# Patient Record
Sex: MALE | Employment: OTHER | URBAN - NONMETROPOLITAN AREA
[De-identification: names, ages, dates, MRNs, and addresses within clinical notes are randomized per-mention and may not be internally consistent; named-entity substitution may affect disease eponyms.]

---

## 2020-09-21 ENCOUNTER — TELEPHONE (OUTPATIENT)
Dept: CARDIOLOGY CLINIC | Age: 65
End: 2020-09-21

## 2020-09-21 NOTE — TELEPHONE ENCOUNTER
Patient was supposed to have an appointment with Dr. Alex Reynaga on Friday but patient cancelled. LM for patient to call back-does he want to reschedule?

## 2020-09-24 ENCOUNTER — OFFICE VISIT (OUTPATIENT)
Dept: CARDIOLOGY CLINIC | Age: 65
End: 2020-09-24
Payer: MEDICARE

## 2020-09-24 ENCOUNTER — TELEPHONE (OUTPATIENT)
Dept: CARDIOLOGY CLINIC | Age: 65
End: 2020-09-24

## 2020-09-24 VITALS
WEIGHT: 224 LBS | HEART RATE: 77 BPM | BODY MASS INDEX: 32.07 KG/M2 | HEIGHT: 70 IN | SYSTOLIC BLOOD PRESSURE: 148 MMHG | DIASTOLIC BLOOD PRESSURE: 88 MMHG

## 2020-09-24 PROCEDURE — 4004F PT TOBACCO SCREEN RCVD TLK: CPT | Performed by: INTERNAL MEDICINE

## 2020-09-24 PROCEDURE — 4040F PNEUMOC VAC/ADMIN/RCVD: CPT | Performed by: INTERNAL MEDICINE

## 2020-09-24 PROCEDURE — G8417 CALC BMI ABV UP PARAM F/U: HCPCS | Performed by: INTERNAL MEDICINE

## 2020-09-24 PROCEDURE — 1123F ACP DISCUSS/DSCN MKR DOCD: CPT | Performed by: INTERNAL MEDICINE

## 2020-09-24 PROCEDURE — 99204 OFFICE O/P NEW MOD 45 MIN: CPT | Performed by: INTERNAL MEDICINE

## 2020-09-24 PROCEDURE — 93000 ELECTROCARDIOGRAM COMPLETE: CPT | Performed by: INTERNAL MEDICINE

## 2020-09-24 PROCEDURE — 3017F COLORECTAL CA SCREEN DOC REV: CPT | Performed by: INTERNAL MEDICINE

## 2020-09-24 PROCEDURE — G8427 DOCREV CUR MEDS BY ELIG CLIN: HCPCS | Performed by: INTERNAL MEDICINE

## 2020-09-24 RX ORDER — ACETAMINOPHEN AND CODEINE PHOSPHATE 300; 30 MG/1; MG/1
TABLET ORAL
Status: ON HOLD | COMMUNITY
Start: 2020-09-16 | End: 2020-12-04 | Stop reason: ALTCHOICE

## 2020-09-24 RX ORDER — LIDOCAINE AND PRILOCAINE 25; 25 MG/G; MG/G
CREAM TOPICAL
Status: ON HOLD | COMMUNITY
Start: 2020-09-16 | End: 2020-12-04 | Stop reason: ALTCHOICE

## 2020-09-24 NOTE — PROGRESS NOTES
Review of Systems   Constitutional: Negative for chills and fever  HENT: Negative for congestion, sinus pressure, sneezing and sore throat. Eyes: Negative for pain, discharge, redness and itching. Respiratory: Negative for apnea, cough  Gastrointestinal: Negative for blood in stool, constipation, diarrhea   Endocrine: Negative for cold intolerance, heat intolerance, polydipsia. Genitourinary: Negative for dysuria, enuresis, flank pain and hematuria. Musculoskeletal: Negative for arthralgias, joint swelling and neck pain. Neurological: Negative for numbness and headaches. Psychiatric/Behavioral: Negative for agitation, confusion, decreased concentration and dysphoric mood. Objective:     BP (!) 152/93   Pulse 77   Ht 5' 10\" (1.778 m)   Wt 224 lb (101.6 kg)   BMI 32.14 kg/m²     Wt Readings from Last 3 Encounters:   09/24/20 224 lb (101.6 kg)     BP Readings from Last 3 Encounters:   09/24/20 (!) 152/93       Nursing note and vitals reviewed. Physical Exam   Constitutional: Oriented to person, place, and time. Appears well-developed and well-nourished. HENT:   Head: Normocephalic and atraumatic. Eyes: EOM are normal. Pupils are equal, round, and reactive to light. Neck: Normal range of motion. Neck supple. No JVD present. Cardiovascular: Normal rate, regular rhythm, normal heart sounds and intact distal pulses. No murmur heard. Pulmonary/Chest: Effort normal and breath sounds normal. No respiratory distress. No wheezes. No rales. Abdominal: Soft. Bowel sounds are normal. No distension. There is no tenderness. Musculoskeletal: Normal range of motion. No edema. Neurological: Alert and oriented to person, place, and time. No cranial nerve deficit. Coordination normal.   Skin: Skin is warm and dry. L medial ulcer, irregular, bluish discoloration. Psychiatric: Normal mood and affect.        No results found for: CKTOTAL, CKMB, CKMBINDEX    No results found for: WBC, RBC,

## 2020-09-24 NOTE — TELEPHONE ENCOUNTER
Patient called in and stated that he would like to go ahead with the IVUS procedure. Can we get an order?

## 2020-09-29 NOTE — TELEPHONE ENCOUNTER
Peripheral angio scheduled 10-16-20 @ 7:00am  Call to pt, pt requests to wait until November due to his job  Spoke to Abbie Maldonado and dr Jayesh Best  Rescheduled to 12-04-20 @ 1:00pm  Pt informed, date, time and instructions reviewed and mailed to pt

## 2020-12-04 ENCOUNTER — HOSPITAL ENCOUNTER (OUTPATIENT)
Dept: INTERVENTIONAL RADIOLOGY/VASCULAR | Age: 65
Discharge: HOME | End: 2020-12-05
Attending: INTERNAL MEDICINE | Admitting: INTERNAL MEDICINE
Payer: MEDICARE

## 2020-12-04 PROBLEM — I87.9 VENOUS DISEASE: Status: ACTIVE | Noted: 2020-12-04

## 2020-12-04 LAB
ABO: NORMAL
ANION GAP SERPL CALCULATED.3IONS-SCNC: 13 MEQ/L (ref 8–16)
ANTIBODY SCREEN: NORMAL
BUN BLDV-MCNC: 15 MG/DL (ref 7–22)
CALCIUM SERPL-MCNC: 9.4 MG/DL (ref 8.5–10.5)
CHLORIDE BLD-SCNC: 105 MEQ/L (ref 98–111)
CO2: 23 MEQ/L (ref 23–33)
CREAT SERPL-MCNC: 0.9 MG/DL (ref 0.4–1.2)
ERYTHROCYTE [DISTWIDTH] IN BLOOD BY AUTOMATED COUNT: 12.9 % (ref 11.5–14.5)
ERYTHROCYTE [DISTWIDTH] IN BLOOD BY AUTOMATED COUNT: 44.2 FL (ref 35–45)
GFR SERPL CREATININE-BSD FRML MDRD: 84 ML/MIN/1.73M2
GLUCOSE BLD-MCNC: 103 MG/DL (ref 70–108)
HCT VFR BLD CALC: 43.6 % (ref 42–52)
HEMOGLOBIN: 15.1 GM/DL (ref 14–18)
INR BLD: 0.96 (ref 0.85–1.13)
MCH RBC QN AUTO: 32.5 PG (ref 26–33)
MCHC RBC AUTO-ENTMCNC: 34.6 GM/DL (ref 32.2–35.5)
MCV RBC AUTO: 93.8 FL (ref 80–94)
PLATELET # BLD: 217 THOU/MM3 (ref 130–400)
PMV BLD AUTO: 9.9 FL (ref 9.4–12.4)
POTASSIUM SERPL-SCNC: 4.5 MEQ/L (ref 3.5–5.2)
RBC # BLD: 4.65 MILL/MM3 (ref 4.7–6.1)
RH FACTOR: NORMAL
SODIUM BLD-SCNC: 141 MEQ/L (ref 135–145)
WBC # BLD: 7.6 THOU/MM3 (ref 4.8–10.8)

## 2020-12-04 PROCEDURE — 37252 INTRVASC US NONCORONARY 1ST: CPT | Performed by: INTERNAL MEDICINE

## 2020-12-04 PROCEDURE — 6370000000 HC RX 637 (ALT 250 FOR IP): Performed by: INTERNAL MEDICINE

## 2020-12-04 PROCEDURE — 85027 COMPLETE CBC AUTOMATED: CPT

## 2020-12-04 PROCEDURE — 2580000003 HC RX 258: Performed by: INTERNAL MEDICINE

## 2020-12-04 PROCEDURE — 86901 BLOOD TYPING SEROLOGIC RH(D): CPT

## 2020-12-04 PROCEDURE — 37238 OPEN/PERQ PLACE STENT SAME: CPT | Performed by: INTERNAL MEDICINE

## 2020-12-04 PROCEDURE — 76499 UNLISTED DX RADIOGRAPHIC PX: CPT | Performed by: INTERNAL MEDICINE

## 2020-12-04 PROCEDURE — C1725 CATH, TRANSLUMIN NON-LASER: HCPCS

## 2020-12-04 PROCEDURE — 37239 OPEN/PERQ PLACE STENT EA ADD: CPT | Performed by: INTERNAL MEDICINE

## 2020-12-04 PROCEDURE — 80048 BASIC METABOLIC PNL TOTAL CA: CPT

## 2020-12-04 PROCEDURE — 85610 PROTHROMBIN TIME: CPT

## 2020-12-04 PROCEDURE — 75822 VEIN X-RAY ARMS/LEGS: CPT | Performed by: INTERNAL MEDICINE

## 2020-12-04 PROCEDURE — 6360000002 HC RX W HCPCS: Performed by: INTERNAL MEDICINE

## 2020-12-04 PROCEDURE — 37253 INTRVASC US NONCORONARY ADDL: CPT | Performed by: INTERNAL MEDICINE

## 2020-12-04 PROCEDURE — 36010 PLACE CATHETER IN VEIN: CPT | Performed by: INTERNAL MEDICINE

## 2020-12-04 PROCEDURE — 6360000004 HC RX CONTRAST MEDICATION: Performed by: INTERNAL MEDICINE

## 2020-12-04 PROCEDURE — 6360000002 HC RX W HCPCS

## 2020-12-04 PROCEDURE — C1769 GUIDE WIRE: HCPCS

## 2020-12-04 PROCEDURE — 2500000003 HC RX 250 WO HCPCS

## 2020-12-04 PROCEDURE — 86900 BLOOD TYPING SEROLOGIC ABO: CPT

## 2020-12-04 PROCEDURE — C1894 INTRO/SHEATH, NON-LASER: HCPCS

## 2020-12-04 PROCEDURE — 36415 COLL VENOUS BLD VENIPUNCTURE: CPT

## 2020-12-04 PROCEDURE — 2709999900 HC NON-CHARGEABLE SUPPLY

## 2020-12-04 PROCEDURE — 86850 RBC ANTIBODY SCREEN: CPT

## 2020-12-04 RX ORDER — CLOPIDOGREL 300 MG/1
600 TABLET, FILM COATED ORAL ONCE
Status: COMPLETED | OUTPATIENT
Start: 2020-12-04 | End: 2020-12-04

## 2020-12-04 RX ORDER — FENTANYL CITRATE 50 UG/ML
50 INJECTION, SOLUTION INTRAMUSCULAR; INTRAVENOUS ONCE
Status: COMPLETED | OUTPATIENT
Start: 2020-12-04 | End: 2020-12-04

## 2020-12-04 RX ORDER — MIDAZOLAM HYDROCHLORIDE 2 MG/2ML
1 INJECTION, SOLUTION INTRAMUSCULAR; INTRAVENOUS ONCE
Status: COMPLETED | OUTPATIENT
Start: 2020-12-04 | End: 2020-12-04

## 2020-12-04 RX ORDER — ASPIRIN 81 MG/1
81 TABLET ORAL DAILY
COMMUNITY

## 2020-12-04 RX ORDER — SODIUM CHLORIDE 9 MG/ML
INJECTION, SOLUTION INTRAVENOUS CONTINUOUS
Status: DISCONTINUED | OUTPATIENT
Start: 2020-12-04 | End: 2020-12-05 | Stop reason: HOSPADM

## 2020-12-04 RX ORDER — SODIUM CHLORIDE 0.9 % (FLUSH) 0.9 %
10 SYRINGE (ML) INJECTION EVERY 12 HOURS SCHEDULED
Status: DISCONTINUED | OUTPATIENT
Start: 2020-12-04 | End: 2020-12-05 | Stop reason: HOSPADM

## 2020-12-04 RX ORDER — CLOPIDOGREL BISULFATE 75 MG/1
75 TABLET ORAL DAILY
Status: DISCONTINUED | OUTPATIENT
Start: 2020-12-05 | End: 2020-12-05 | Stop reason: HOSPADM

## 2020-12-04 RX ORDER — ACETAMINOPHEN 325 MG/1
650 TABLET ORAL EVERY 4 HOURS PRN
Status: DISCONTINUED | OUTPATIENT
Start: 2020-12-04 | End: 2020-12-05 | Stop reason: HOSPADM

## 2020-12-04 RX ORDER — HEPARIN SODIUM 1000 [USP'U]/ML
12000 INJECTION, SOLUTION INTRAVENOUS; SUBCUTANEOUS ONCE
Status: COMPLETED | OUTPATIENT
Start: 2020-12-04 | End: 2020-12-04

## 2020-12-04 RX ORDER — SODIUM CHLORIDE 0.9 % (FLUSH) 0.9 %
10 SYRINGE (ML) INJECTION PRN
Status: DISCONTINUED | OUTPATIENT
Start: 2020-12-04 | End: 2020-12-05 | Stop reason: HOSPADM

## 2020-12-04 RX ORDER — ASPIRIN 325 MG
325 TABLET ORAL ONCE
Status: COMPLETED | OUTPATIENT
Start: 2020-12-04 | End: 2020-12-04

## 2020-12-04 RX ORDER — SODIUM CHLORIDE 0.9 % (FLUSH) 0.9 %
10 SYRINGE (ML) INJECTION PRN
Status: DISCONTINUED | OUTPATIENT
Start: 2020-12-04 | End: 2020-12-04 | Stop reason: SDUPTHER

## 2020-12-04 RX ORDER — ASPIRIN 81 MG/1
81 TABLET, CHEWABLE ORAL DAILY
Status: DISCONTINUED | OUTPATIENT
Start: 2020-12-05 | End: 2020-12-05 | Stop reason: HOSPADM

## 2020-12-04 RX ADMIN — MIDAZOLAM 1 MG: 1 INJECTION INTRAMUSCULAR; INTRAVENOUS at 14:17

## 2020-12-04 RX ADMIN — FENTANYL CITRATE 50 MCG: 50 INJECTION, SOLUTION INTRAMUSCULAR; INTRAVENOUS at 14:23

## 2020-12-04 RX ADMIN — SODIUM CHLORIDE: 9 INJECTION, SOLUTION INTRAVENOUS at 11:59

## 2020-12-04 RX ADMIN — ENOXAPARIN SODIUM 100 MG: 100 INJECTION SUBCUTANEOUS at 16:33

## 2020-12-04 RX ADMIN — ASPIRIN 325 MG: 325 TABLET, FILM COATED ORAL at 12:08

## 2020-12-04 RX ADMIN — FENTANYL CITRATE 50 MCG: 50 INJECTION, SOLUTION INTRAMUSCULAR; INTRAVENOUS at 15:01

## 2020-12-04 RX ADMIN — FENTANYL CITRATE 50 MCG: 50 INJECTION, SOLUTION INTRAMUSCULAR; INTRAVENOUS at 14:07

## 2020-12-04 RX ADMIN — MIDAZOLAM 1 MG: 1 INJECTION INTRAMUSCULAR; INTRAVENOUS at 14:23

## 2020-12-04 RX ADMIN — ACETAMINOPHEN 650 MG: 325 TABLET ORAL at 21:35

## 2020-12-04 RX ADMIN — MIDAZOLAM 1 MG: 1 INJECTION INTRAMUSCULAR; INTRAVENOUS at 15:01

## 2020-12-04 RX ADMIN — IOPAMIDOL 100 ML: 612 INJECTION, SOLUTION INTRAVENOUS at 15:25

## 2020-12-04 RX ADMIN — HEPARIN SODIUM 12000 UNITS: 1000 INJECTION INTRAVENOUS; SUBCUTANEOUS at 14:50

## 2020-12-04 RX ADMIN — CLOPIDOGREL BISULFATE 600 MG: 300 TABLET, FILM COATED ORAL at 15:38

## 2020-12-04 RX ADMIN — MIDAZOLAM 1 MG: 1 INJECTION INTRAMUSCULAR; INTRAVENOUS at 14:07

## 2020-12-04 RX ADMIN — FENTANYL CITRATE 50 MCG: 50 INJECTION, SOLUTION INTRAMUSCULAR; INTRAVENOUS at 14:17

## 2020-12-04 ASSESSMENT — PAIN SCALES - GENERAL
PAINLEVEL_OUTOF10: 0
PAINLEVEL_OUTOF10: 2
PAINLEVEL_OUTOF10: 2
PAINLEVEL_OUTOF10: 0
PAINLEVEL_OUTOF10: 2
PAINLEVEL_OUTOF10: 5
PAINLEVEL_OUTOF10: 2
PAINLEVEL_OUTOF10: 0
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
## 2020-12-04 NOTE — PROGRESS NOTES
80 Pt arrived in IR for procedure with Dr. Olena Cotter. Denies c/o pain at this time.    1353 Report given to [facility], [facility]

## 2020-12-04 NOTE — PROGRESS NOTES
Care taken over from IR. VSS. Slipknot closure devices in place to bilateral popliteal veins. No bleeding or swelling noted. Patient reinstructed on post-procedure instructions, pt expressed understanding. 0.9 NS infusing. Pt tolerating sips of water without difficulty. Will continue to monitor.

## 2020-12-04 NOTE — BRIEF OP NOTE
[address] [address]  Sedation/Analgesia Post Sedation Record    Pt Name: Eliza Bettencourt  Account number: [de-identified]  MRN: [id number]  YOB: 1955  Procedure Performed By: Alexus Delatorre, [address]  Primary Care Physician: Cas Palmer MD  Date: 12/4/2020    POST-PROCEDURE    Physicians/Assistants: SYLVESTER Briseno MD    Procedure Performed:Peripheral Angiogram/Intervention      Sedation/Anesthesia: Versed/ Fentanyl and 2% xylocaine local anesthesia. Estimated Blood Loss: < 50 ml. Specimens Removed: None         Disposition of Specimen: N/A        Complications: No Immediate Complications.        Post-procedure Diagnosis/Findings:    Bilateral EIV compression by overlying arterial system, however patient is adamant that there are no symptoms or physical issues on the RLE, thus we proceeded with left sided intervention    IVUS guided Venovo stenting with 18 x 80 and 16 x 80 post dilated with an 18 mm [place] with brisk flow into the IVC    Lovenox 1 mg/kg BID and DAPT                 SYLVESTER Briseno MD  Electronically signed 12/4/2020 at 3:34 PM  Interventional Cardiology

## 2020-12-05 VITALS
HEIGHT: 70 IN | DIASTOLIC BLOOD PRESSURE: 77 MMHG | WEIGHT: 220 LBS | SYSTOLIC BLOOD PRESSURE: 116 MMHG | TEMPERATURE: 97.8 F | BODY MASS INDEX: 31.5 KG/M2 | HEART RATE: 64 BPM | RESPIRATION RATE: 15 BRPM | OXYGEN SATURATION: 98 %

## 2020-12-05 PROCEDURE — 6360000002 HC RX W HCPCS: Performed by: INTERNAL MEDICINE

## 2020-12-05 PROCEDURE — APPNB30 APP NON BILLABLE TIME 0-30 MINS: Performed by: PHYSICIAN ASSISTANT

## 2020-12-05 RX ORDER — CLOPIDOGREL BISULFATE 75 MG/1
75 TABLET ORAL DAILY
Qty: 30 TABLET | Refills: 3 | Status: SHIPPED | OUTPATIENT
Start: 2020-12-05 | End: 2021-04-26 | Stop reason: SDUPTHER

## 2020-12-05 RX ADMIN — ENOXAPARIN SODIUM 100 MG: 100 INJECTION SUBCUTANEOUS at 04:44

## 2020-12-05 NOTE — PROGRESS NOTES
1020 slip dedra loosened as ordered, patient ambulated in hallway. Band-Aid applied to site no bleeding noted at this time. Patient denies pain, bilateral lower extremeties pulses palpable 2+.

## 2020-12-05 NOTE — PROGRESS NOTES
Cardiology Progress Note      Patient:  Chipper Bumpers  YOB: 1955  MRN: [id number]   Acct: [de-identified]  Admit Date:  12/4/2020  Primary Cardiologist: Festus Esqueda MD    Here for outpt venogram    Subjective (Events in last 24 hours): pt awake and alert. NAD. No cp or sob. Wants to go home. No complaints      Objective:   /77   Pulse 64   Temp 97.8 °F (36.6 °C) (Oral)   Resp 15   Ht 5' 10\" (1.778 m)   Wt 220 lb (99.8 kg)   SpO2 98%   BMI 31.57 kg/m²        TELEMETRY: nsr    Physical Exam:  General Appearance: alert and oriented to person, place and time, in no acute distress  Cardiovascular: normal rate, regular rhythm, normal S1 and S2, no murmurs, rubs, clicks, or gallops, distal pulses intact, no carotid bruits, no JVD  Pulmonary/Chest: clear to auscultation bilaterally- no wheezes, rales or rhonchi, normal air movement, no respiratory distress  Abdomen: soft, non-tender, non-distended, normal bowel sounds, no masses Extremities: no cyanosis, clubbing or edema, pulse   Skin: warm and dry, no rash or erythema  Head: normocephalic and atraumatic  Eyes: pupils equal, round, and reactive to light  Neck: supple and non-tender without mass, no thyromegaly   Musculoskeletal: normal range of motion, no joint swelling, deformity or tenderness  Neurological: alert, oriented, normal speech, no focal findings or movement disorder noted  Access site - no hematoma or bleeding    Medications:    enoxaparin  1 mg/kg Subcutaneous BID    sodium chloride flush  10 mL Intravenous 2 times per day    aspirin  81 mg Oral Daily    clopidogrel  75 mg Oral Daily      sodium chloride 75 mL/hr at 12/04/20 1159     sodium chloride flush, 10 mL, PRN  acetaminophen, 650 mg, Q4H PRN        Lab Data:    Cardiac Enzymes:  No results for input(s): CKTOTAL, CKMB, CKMBINDEX, TROPONINI in the last 72 hours.     CBC:   Lab Results   Component Value Date    WBC 7.6 12/04/2020    RBC 4.65 12/04/2020    HGB 15.1 12/04/2020    HCT 43.6 12/04/2020     12/04/2020       CMP:    Lab Results   Component Value Date     12/04/2020    K 4.5 12/04/2020     12/04/2020    CO2 23 12/04/2020    BUN 15 12/04/2020    CREATININE 0.9 12/04/2020    LABGLOM 84 12/04/2020    GLUCOSE 103 12/04/2020    CALCIUM 9.4 12/04/2020       Hepatic Function Panel:  No results found for: ALKPHOS, ALT, AST, PROT, BILITOT, BILIDIR, IBILI, LABALBU    Magnesium:  No results found for: MG    PT/INR:    Lab Results   Component Value Date    INR 0.96 12/04/2020       HgBA1c:  No results found for: LABA1C    FLP:  No results found for: TRIG, HDL, LDLCALC, LDLDIRECT, LABVLDL    TSH:  No results found for: TSH      Assessment:    Bilateral EIV compression by overlying arterial system, however patient is adamant that there are no symptoms or physical issues on the RLE, thus we proceeded with left sided intervention     IVUS guided Venovo stenting with 18 x 80 and 16 x 80 post dilated with an 18 mm [place] with brisk flow into the IVC     Lovenox 1 mg/kg BID and DAPT       Plan:    Discharge home   See dc instructions         Electronically signed by Everton Morales PA-C on 12/5/2020 at 7:46 AM

## 2020-12-05 NOTE — DISCHARGE INSTR - COC
Continuity of Care Form    Patient Name: Raymundo Alonso   :  1955  MRN:  [id number]    Admit date:  2020  Discharge date:  ***    Code Status Order: Full Code   Advance Directives:   [facility] Documentation     Date/Time Healthcare Directive Type of Healthcare Directive Copy in [facility] [address] Agent's Name Healthcare Agent's Phone Number    20 1137  No, patient does not have an advance directive for healthcare treatment -- -- -- -- --          Admitting Physician:  Vania Diaz MD  PCP: Renée Hays MD    Discharging Nurse: [facility] Unit/Room#: 2E-11/011-A  Discharging Unit Phone Number: ***    Emergency Contact:   Extended Emergency Contact Information  Primary Emergency Contact: [address] [facility] Phone: [phone]  Relation: Spouse   needed? No    Past Surgical History:  Past Surgical History:   Procedure Laterality Date    COLONOSCOPY      KNEE SURGERY      both knees, meniscus tears    ROTATOR CUFF REPAIR Bilateral     VARICOSE VEIN SURGERY      both legs       Immunization History: There is no immunization history on file for this patient.     Active Problems:  Patient Active Problem List   Diagnosis Code    Venous disease I87.9    Venous insufficiency of left lower extremity I87.2    Leg pain, left M79.605    Venous intermittent claudication I87.8       Isolation/Infection:   Isolation          No Isolation        Patient Infection Status     None to display          Nurse Assessment:  Last Vital Signs: /77   Pulse 64   Temp 97.8 °F (36.6 °C) (Oral)   Resp 15   Ht 5' 10\" (1.778 m)   Wt 220 lb (99.8 kg)   SpO2 98%   BMI 31.57 kg/m²     Last documented pain score (0-10 scale): Pain Level: 0  Last Weight:   Wt Readings from Last 1 Encounters:   20 220 lb (99.8 kg)     Mental Status:  {IP PT MENTAL STATUS:}    IV Access:  [facility] IV ACCESS:51953}    Nursing Mobility/ADLs:  Walking   {CHP DME SFKR:969953609}  Transfer  {CHP DME EGYN:098954672}  Bathing  {CHP DME UUUA:272719473}  Dressing  {CHP DME PUST:022764537}  Toileting  {CHP DME ZOQL:987234967}  Feeding  {CHP DME HBDW:474665027}  Med Admin  {P DME JONATHON:258742095}  Med Delivery   {[facility] MED Delivery:565213622}    Wound Care Documentation and Therapy:        Elimination:  Continence:   · Bowel: {YES / XB:82748}  · Bladder: {YES / K}  Urinary Catheter: {Urinary Catheter:484369161}   Colostomy/Ileostomy/Ileal Conduit: {YES / T}       Date of Last BM: ***  No intake or output data in the 24 hours ending 20 0807  No intake/output data recorded.     Safety Concerns:     [employer] Safety Concerns:078031462}    Impairments/Disabilities:      [employer] Impairments/Disabilities:630815636}    Nutrition Therapy:  Current Nutrition Therapy:   [employer] Diet List:641991117}    Routes of Feeding: {[facility] DME Other Feedings:168060510}  Liquids: {Slp liquid thickness:55963}  Daily Fluid Restriction: {P DME Yes amt example:709953634}  Last Modified Barium Swallow with Video (Video Swallowing Test): {Done Not Done XXFP:551690062}    Treatments at the Time of Hospital Discharge:   Respiratory Treatments: ***  Oxygen Therapy:  {Therapy; copd oxygen:88891}  Ventilator:    {[facility] Vent AURM:316228371}    Rehab Therapies: {THERAPEUTIC INTERVENTION:7777804016}  Weight Bearing Status/Restrictions: [address] [employer] Weight Bearin}  Other Medical Equipment (for information only, NOT a DME order):  {EQUIPMENT:112847318}  Other Treatments: ***    Patient's personal belongings (please select all that are sent with patient):  {[facility] DME Belongings:315399120}    RN SIGNATURE:  {Esignature:795179628}    CASE MANAGEMENT/SOCIAL WORK SECTION    Inpatient Status Date: ***    Readmission Risk Assessment Score:  Readmission Risk              Risk of Unplanned Readmission:        0           Discharging to Facility/ Agency   · Name: · Address:  · Phone:  · Fax:    Dialysis Facility (if applicable)   · Name:  · Address:  · Dialysis Schedule:  · Phone:  · Fax:    / signature: {Esignature:991080143}    PHYSICIAN SECTION    Prognosis: {Prognosis:5569681413}    Condition at Discharge: Taniya Hernandez Patient Condition:705369833}    Rehab Potential (if transferring to Rehab): {Prognosis:6428099748}    Recommended Labs or Other Treatments After Discharge: ***    Physician Certification: I certify the above information and transfer of Rober Goldberg  is necessary for the continuing treatment of the diagnosis listed and that he requires {Admit to Appropriate Level of Care:07507} for {GREATER/LESS:964122987} 30 days.      Update Admission H&P: {CHP DME Changes in CVON}    PHYSICIAN SIGNATURE:  {Esignature:895097660}

## 2020-12-08 NOTE — PROCEDURES
[address] [place] [address]                            CARDIAC CATHETERIZATION    PATIENT NAME: Darline Goodson                   :        1955  MED REC NO:   [id number]                           ROOM:       0011  ACCOUNT NO:   [de-identified]                           ADMIT DATE: 2020  PROVIDER:     Troy Calle MD    DATE OF PROCEDURE:  2020    PERIPHERAL VENOGRAPHY/INTERVENTION    INDICATIONS:  Left lower extremity medial ankle ulcer for over 19 years  with superficial varicosities, status post sclerotherapy and CEAP  classification at C6 with bilateral venous stripping. DESCRIPTION OF PROCEDURE:  After informed consent was obtained from the  patient, he was brought to the special procedure suite and prepped in  sterile fashion. Bilateral popliteal veins were chosen as primary  points of access. Preprocedure timeout was completed. The patient was  laid prone and after infiltration of the right and left popliteal fossa  with 2% lidocaine using micropuncture, modified Seldinger technique and  fluoroscopic guidance and ultrasound guidance, I was able to insert an  8-[place] sheath bilaterally into both popliteal veins. I then performed  ascending venography from this position. LEFT LOWER EXTREMITY:  POPLITEAL VEIN:  Patent. FEMORAL VEIN:  Patent. COMMON FEMORAL VEIN:  Patent. EXTERNAL ILIAC VEIN:  Definite narrowing, appears to have compression at  the level of the common iliac vein with streaming of blood flow. COMMON ILIAC VEIN:  Patent. RIGHT LOWER EXTREMITY:  POPLITEAL VEIN:  Patent. FEMORAL VEIN:  Patent. COMMON FEMORAL VEIN:  Patent. EXTERNAL ILIAC VEIN:  Severely narrowed, approximately 80-90%, vein  angiography. COMMON ILIAC VEIN:  80-90% narrowing. IVC:  Patent.     IVUS:  At that point, bilateral 0.035 Glidewires were placed at the IVC  and then IVUS pullback was done from IVC into both common femoral veins. IVUS:  LEFT LOWER EXTREMITY:  The left leg showed a reference area of 71 mm2  with 60-70% iliac stenosis. The external iliac vein also shows  reference area of 72 mm2. Significant perivenous fibrosis and compression by  Overlying arterial structures. CFV  123 mm2. No thrombosis or major webs seen. RIGHT LOWER EXTREMITY:  On the right side, right common iliac vein  showed an area of 78 mm2, the external iliac vein on the right side  showed an area of 22 mm2, and the common femoral vein showed an area of  110 mm2. There was significant perivenous fibrosis and narrowing of  both common iliac and external iliac veins. On the right side, it was  near totally obliterated, held open by the catheter being in position. We did have the patient do inhalation and exhalation exercises to see if  the diameter change. There was almost minimal change in both external  iliac veins. INTERVENTION:  The patient was clear that he did not have nay right lower  extremity symptoms by any means. He felt well. He had no ulcers. He had no  Venous claudication. He had no recurrent ulcerations. He did have  issues with the left lower extremity which he presented for this  reason, we elected to proceed with an intervention on the left side  while monitoring the right side for continued venous disease and  evolution. I exchanged out for a 10-[place] in the left popliteal  fossae. Heparin IV was given. ACT was confirmed to be above 250  seconds. I put the 0.035 Glidewire Advantage up into the IVC. A repeat  IVUS was done to understand the size of the lesion. The lesion involved  the common iliac vein and external iliac vein; however, the diameter  differences were great. Therefore, we needed two stents to cover the  entire lesion and intake of the vessel appropriately. I first  predilated the lesion using 16 x 40 [place] balloon and the entire common  iliac and external iliac vein.   I then stented the lesion using 18 x 80  Venovo venous stent at the common iliac vein level and then overlapping  the stent with a 16 x 80 Venovo venous stent lying almost in normal  section. I then postdilated the stent with 18 [place] balloon. Repeat  IVUS was done at that point, demonstrating good extension of the stent. There was no residual compression shown by any artery or mass. There  was good flow of the IVC. The right lower extremity common iliac vein  and external iliac veins were patent and IVUS confirmed that we had not  jailed either. We had landed the stent just proximal to the confluence,  where the majority of the compression was. Given these findings, no  further intervention was undertaken. IMMEDIATE COMPLICATIONS:  None. MEDICATIONS:  See EMR. ACCESS:  Bilateral figure-of-eight slip knots were used for hemostasis. ESTIMATED BLOOD LOSS:  Less than 50 mL. 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 PLAN:  1. Bedrest.  2.  Optimal medical therapy. 3.  Risk factor management. 4.  Routine access site care. 5.  Overnight observation. 6.  IV fluids. 7.  Lovenox plus DAPT. 8.  Compression stockings. 9.  Follow-up venous ultrasound in one month. [address] to continue to follow up. 11.  Follow up with myself in one month postprocedure. 12.  Monitor RLE for any progressive venous disease. All the above was explained to the patient and the patient's family. They were agreeable and amenable to the plan.         Carmencita Jerome MD    D: 12/08/2020 13:00:53       T: 12/08/2020 14:22:05     [email or web address]  Job#: [id number]     Doc#: [id number]    CC:

## 2020-12-09 ENCOUNTER — TELEPHONE (OUTPATIENT)
Dept: CARDIOLOGY CLINIC | Age: 65
End: 2020-12-09

## 2020-12-09 NOTE — TELEPHONE ENCOUNTER
DIANA  Pt called today concerned with lovenox injections   States 2 out of the 9 shots he has given himself seeped a LITTLE bit of blood for 24 hours, nothing that a Band-Aid couldn't hold   Advised pt to hold a little pressure 1-2 mins after he gives himself an injection to see if that takes are of the issues   No other bleeding issues     Pt advised to call office back if anymore issues     Anything else?

## 2020-12-10 NOTE — TELEPHONE ENCOUNTER
Pt notified and he is doing all the below, he states he is doing ok so far. He does have a little leakage after but a bandaid helps. And feels a little sluggish about an hour after taking.

## 2021-01-15 ENCOUNTER — OFFICE VISIT (OUTPATIENT)
Dept: CARDIOLOGY CLINIC | Age: 66
End: 2021-01-15
Payer: MEDICARE

## 2021-01-15 VITALS
SYSTOLIC BLOOD PRESSURE: 138 MMHG | HEART RATE: 88 BPM | HEIGHT: 70 IN | DIASTOLIC BLOOD PRESSURE: 70 MMHG | BODY MASS INDEX: 33.56 KG/M2 | TEMPERATURE: 97 F | WEIGHT: 234.44 LBS

## 2021-01-15 DIAGNOSIS — L97.922 ULCER OF LEFT LOWER EXTREMITY WITH FAT LAYER EXPOSED (HCC): Primary | ICD-10-CM

## 2021-01-15 DIAGNOSIS — I87.2 VENOUS INSUFFICIENCY OF LEFT LOWER EXTREMITY: ICD-10-CM

## 2021-01-15 PROCEDURE — G8484 FLU IMMUNIZE NO ADMIN: HCPCS | Performed by: INTERNAL MEDICINE

## 2021-01-15 PROCEDURE — 3017F COLORECTAL CA SCREEN DOC REV: CPT | Performed by: INTERNAL MEDICINE

## 2021-01-15 PROCEDURE — G8417 CALC BMI ABV UP PARAM F/U: HCPCS | Performed by: INTERNAL MEDICINE

## 2021-01-15 PROCEDURE — 1036F TOBACCO NON-USER: CPT | Performed by: INTERNAL MEDICINE

## 2021-01-15 PROCEDURE — 99213 OFFICE O/P EST LOW 20 MIN: CPT | Performed by: INTERNAL MEDICINE

## 2021-01-15 PROCEDURE — 4040F PNEUMOC VAC/ADMIN/RCVD: CPT | Performed by: INTERNAL MEDICINE

## 2021-01-15 PROCEDURE — 1123F ACP DISCUSS/DSCN MKR DOCD: CPT | Performed by: INTERNAL MEDICINE

## 2021-01-15 PROCEDURE — G8428 CUR MEDS NOT DOCUMENT: HCPCS | Performed by: INTERNAL MEDICINE
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
## 2021-04-26 RX ORDER — CLOPIDOGREL BISULFATE 75 MG/1
75 TABLET ORAL DAILY
Qty: 30 TABLET | Refills: 1 | Status: SHIPPED | OUTPATIENT
Start: 2021-04-26 | End: 2021-08-13

## 2021-04-26 NOTE — TELEPHONE ENCOUNTER
Juanita Hamilton called requesting a refill on the following medications:  Requested Prescriptions     Pending Prescriptions Disp Refills    clopidogrel (PLAVIX) 75 MG tablet 30 tablet 3     Sig: Take 1 tablet by mouth daily     Pharmacy verified:[facility]      Date of last visit:   Date of next visit (if applicable): Visit date not found

## 2021-08-13 ENCOUNTER — OFFICE VISIT (OUTPATIENT)
Dept: CARDIOLOGY CLINIC | Age: 66
End: 2021-08-13
Payer: MEDICARE

## 2021-08-13 VITALS
WEIGHT: 229.6 LBS | DIASTOLIC BLOOD PRESSURE: 82 MMHG | SYSTOLIC BLOOD PRESSURE: 140 MMHG | HEIGHT: 70 IN | HEART RATE: 72 BPM | BODY MASS INDEX: 32.87 KG/M2

## 2021-08-13 DIAGNOSIS — I87.2 VENOUS INSUFFICIENCY OF LEFT LOWER EXTREMITY: Primary | ICD-10-CM

## 2021-08-13 PROCEDURE — 1036F TOBACCO NON-USER: CPT | Performed by: INTERNAL MEDICINE

## 2021-08-13 PROCEDURE — G8427 DOCREV CUR MEDS BY ELIG CLIN: HCPCS | Performed by: INTERNAL MEDICINE

## 2021-08-13 PROCEDURE — 3017F COLORECTAL CA SCREEN DOC REV: CPT | Performed by: INTERNAL MEDICINE

## 2021-08-13 PROCEDURE — 99213 OFFICE O/P EST LOW 20 MIN: CPT | Performed by: INTERNAL MEDICINE

## 2021-08-13 PROCEDURE — G8417 CALC BMI ABV UP PARAM F/U: HCPCS | Performed by: INTERNAL MEDICINE

## 2021-08-13 PROCEDURE — 1123F ACP DISCUSS/DSCN MKR DOCD: CPT | Performed by: INTERNAL MEDICINE

## 2021-08-13 PROCEDURE — 4040F PNEUMOC VAC/ADMIN/RCVD: CPT | Performed by: INTERNAL MEDICINE
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
